# Patient Record
Sex: FEMALE | URBAN - METROPOLITAN AREA
[De-identification: names, ages, dates, MRNs, and addresses within clinical notes are randomized per-mention and may not be internally consistent; named-entity substitution may affect disease eponyms.]

---

## 2018-06-22 ENCOUNTER — APPOINTMENT (RX ONLY)
Dept: URBAN - METROPOLITAN AREA CLINIC 62 | Facility: CLINIC | Age: 23
Setting detail: DERMATOLOGY
End: 2018-06-22

## 2018-06-22 DIAGNOSIS — L71.8 OTHER ROSACEA: ICD-10-CM

## 2018-06-22 PROBLEM — L70.0 ACNE VULGARIS: Status: ACTIVE | Noted: 2018-06-22

## 2018-06-22 PROCEDURE — ? PRESCRIPTION

## 2018-06-22 PROCEDURE — ? PRESCRIPTION SAMPLES PROVIDED

## 2018-06-22 PROCEDURE — 99212 OFFICE O/P EST SF 10 MIN: CPT

## 2018-06-22 PROCEDURE — ? COUNSELING

## 2018-06-22 RX ORDER — DOXYCYCLINE 40 MG/1
CAPSULE ORAL
Qty: 30 | Refills: 6 | Status: ERX | COMMUNITY
Start: 2018-06-22

## 2018-06-22 RX ORDER — IVERMECTIN 10 MG/G
CREAM TOPICAL
Qty: 1 | Refills: 5 | Status: ERX | COMMUNITY
Start: 2018-06-22

## 2018-06-22 RX ADMIN — DOXYCYCLINE: 40 CAPSULE ORAL at 12:43

## 2018-06-22 RX ADMIN — IVERMECTIN: 10 CREAM TOPICAL at 12:42

## 2018-06-22 ASSESSMENT — LOCATION SIMPLE DESCRIPTION DERM: LOCATION SIMPLE: RIGHT CHEEK

## 2018-06-22 ASSESSMENT — LOCATION DETAILED DESCRIPTION DERM: LOCATION DETAILED: RIGHT CENTRAL MALAR CHEEK

## 2018-06-22 ASSESSMENT — LOCATION ZONE DERM: LOCATION ZONE: FACE

## 2018-06-22 NOTE — PROCEDURE: PRESCRIPTION SAMPLES PROVIDED
Detail Level: Zone
Expiration Date (Optional): 10/19
Expiration Date (Optional): 2/21
Samples Given: Oracea
Lot/Batch Number (Optional): [id number]
Lot/Batch Number (Optional): [id number]
Samples Given: Soolantra

## 2018-06-22 NOTE — HPI: PIMPLES (ACNE)
How Severe Is Your Acne?: moderate
Is This A New Presentation, Or A Follow-Up?: Acne
Additional Comments (Use Complete Sentences): Patient had Hodgkin’s Lymphoma in 2010. PCP Dr Gavin Briceño.

## 2019-05-06 ENCOUNTER — APPOINTMENT (RX ONLY)
Dept: URBAN - METROPOLITAN AREA CLINIC 62 | Facility: CLINIC | Age: 24
Setting detail: DERMATOLOGY
End: 2019-05-06

## 2019-05-06 DIAGNOSIS — L71.8 OTHER ROSACEA: ICD-10-CM

## 2019-05-06 PROCEDURE — ? COUNSELING

## 2019-05-06 PROCEDURE — ? PRESCRIPTION

## 2019-05-06 PROCEDURE — 99212 OFFICE O/P EST SF 10 MIN: CPT

## 2019-05-06 PROCEDURE — ? TREATMENT REGIMEN

## 2019-05-06 RX ORDER — DOXYCYCLINE 40 MG/1
CAPSULE ORAL
Qty: 30 | Refills: 11 | Status: ERX

## 2019-05-06 RX ORDER — OXYMETAZOLINE HYDROCHLORIDE 10 MG/G
CREAM TOPICAL
Qty: 1 | Refills: 0 | Status: ERX | COMMUNITY
Start: 2019-05-06

## 2019-05-06 RX ADMIN — OXYMETAZOLINE HYDROCHLORIDE: 10 CREAM TOPICAL at 19:16

## 2019-05-06 ASSESSMENT — LOCATION ZONE DERM: LOCATION ZONE: FACE

## 2019-05-06 ASSESSMENT — LOCATION SIMPLE DESCRIPTION DERM: LOCATION SIMPLE: RIGHT CHEEK

## 2019-05-06 ASSESSMENT — LOCATION DETAILED DESCRIPTION DERM: LOCATION DETAILED: RIGHT INFERIOR CENTRAL MALAR CHEEK

## 2019-05-06 NOTE — HPI: RASH (ROSACEA)
How Severe Is Your Rosacea?: moderate
Is This A New Presentation, Or A Follow-Up?: Follow Up Rosacea
Additional History: Patients primary care physician is Dr. Gavin Briceño.

## 2019-12-10 ENCOUNTER — RX ONLY (OUTPATIENT)
Age: 24
Setting detail: RX ONLY
End: 2019-12-10

## 2020-08-04 ENCOUNTER — APPOINTMENT (RX ONLY)
Dept: URBAN - METROPOLITAN AREA CLINIC 62 | Facility: CLINIC | Age: 25
Setting detail: DERMATOLOGY
End: 2020-08-04

## 2020-08-04 DIAGNOSIS — L71.8 OTHER ROSACEA: ICD-10-CM

## 2020-08-04 PROCEDURE — 99212 OFFICE O/P EST SF 10 MIN: CPT

## 2020-08-04 PROCEDURE — ? COUNSELING

## 2020-08-04 PROCEDURE — ? PRESCRIPTION

## 2020-08-04 RX ORDER — MINOCYCLINE HYDROCHLORIDE 90 MG/1
CAPSULE, EXTENDED RELEASE ORAL
Qty: 30 | Refills: 11 | Status: ERX | COMMUNITY
Start: 2020-08-04

## 2020-08-04 RX ADMIN — MINOCYCLINE HYDROCHLORIDE: 90 CAPSULE, EXTENDED RELEASE ORAL at 00:00

## 2020-08-04 ASSESSMENT — LOCATION DETAILED DESCRIPTION DERM: LOCATION DETAILED: RIGHT INFERIOR CENTRAL MALAR CHEEK

## 2020-08-04 ASSESSMENT — LOCATION ZONE DERM: LOCATION ZONE: FACE

## 2020-08-04 ASSESSMENT — LOCATION SIMPLE DESCRIPTION DERM: LOCATION SIMPLE: RIGHT CHEEK
